# Patient Record
Sex: FEMALE | Race: WHITE
[De-identification: names, ages, dates, MRNs, and addresses within clinical notes are randomized per-mention and may not be internally consistent; named-entity substitution may affect disease eponyms.]

---

## 2017-09-07 ENCOUNTER — HOSPITAL ENCOUNTER (EMERGENCY)
Dept: HOSPITAL 10 - FTE | Age: 67
Discharge: HOME | End: 2017-09-07
Payer: MEDICARE

## 2017-09-07 VITALS
HEIGHT: 64 IN | WEIGHT: 203.93 LBS | HEIGHT: 64 IN | BODY MASS INDEX: 34.82 KG/M2 | BODY MASS INDEX: 34.82 KG/M2 | WEIGHT: 203.93 LBS

## 2017-09-07 DIAGNOSIS — T75.4XXA: Primary | ICD-10-CM

## 2017-09-07 DIAGNOSIS — Z79.82: ICD-10-CM

## 2017-09-07 DIAGNOSIS — I10: ICD-10-CM

## 2017-09-07 DIAGNOSIS — J45.909: ICD-10-CM

## 2017-09-07 PROCEDURE — 93005 ELECTROCARDIOGRAM TRACING: CPT

## 2017-09-07 NOTE — ERD
ER Documentation


Chief Complaint


Date/Time


DATE: 9/7/17 


TIME: 12:50


Chief Complaint


RIGHT ARM PAIN,FEELING DIZZY,POST ELECTRIC SHOCK LAST TUESDAY





HPI


67-year-old female is complaining of right arm pain after electrical shock 2 

days ago.  Patient stated that her washing machine overflowed, and flooded the 

floor.  She went down to  the metal dog bowl with her thumb and 

forefinger, and received a shock.  Patient stated that initially, she was 

unable to move her thumb and fingers.  She still does not have full range of 

motion of her fingers of the right hand.  She still have pain on the pad of her 

thumb.,  She also has pain in her right arm and right shoulder.  Denies falls 

or other impact secondary to the shock.  Denies chest pain or palpitations.





ROS


All systems reviewed and are negative except as per history of present illness.





Medications


Home Meds


Active Scripts


Hydrocodone/Acetaminophen (Norco 5-325 Tablet) 1 Each Tablet, 1 TAB PO Q6H Y 

for SEVERE PAIN LEVEL 7-10, #7 TAB


   Prov:VERONICA COREA. NP         9/7/17


Acetaminophen* (Tylophen*) 500 Mg Capsule, 1 CAP PO Q6H Y for PAIN AND OR 

ELEVATED TEMP, #20 CAP


   Prov:VERONICA COREA. NP         9/7/17


Hydrocodone Bit-Acetaminophen* (Norco*) 5-325 Mg Tab, 1 TAB PO Q6 Y for PAIN, #

10 TAB


   Prov:DAMIAN ALFARO         8/4/15


Reported Medications


Nitroglycerin* (Nitrostat*) 0.4 Mg Tab.subl, 0.4 MG SL Q5MIN Y for CHEST PAIN, 

BOTTLE


   2/27/15


Metoprolol Succinate* (Toprol XL*) 50 Mg Tab.er.24h, 50 MG PO DAILY, TAB


   2/27/15


Aspirin* (Aspirin* (EC)) 81 Mg Tablet.dr, 81 MG PO DAILY, TAB


   2/27/15


Fluticasone Propionate* (Fluticasone Propionate* Nasal) 50 Mcg/Spray - 16 Gm 

[place].susp, 1 SPRAY NASAL BID, EA


   TO EACH NOSTRIL


   2/27/15


Diclofenac Sodium (Pennsaid) 112 Gm Sol.md., 112 GM TP BID


   2/27/15


Albuterol Sulfate* (Proair HFA*) 8.5 Gm Hfa.aer.ad, 2 PUFF INH Q6, INH


   2/27/15


Olopatadine* (Pataday*) 0.2% - 2.5 Ml Drops, 1 DROP BOTH EYES DAILY, EA


   2/27/15


Ergocalciferol (Vitamin D2) 400 Unit Tablet, 1.25 MG PO weekly, TAB


   2/27/15


Hydrochlorothiazide* (Hydrochlorothiazide*) 25 Mg Tab, 25 MG PO DAILY, TAB


   1/4/15


Meloxicam* (Meloxicam*) 7.5 Mg/5 Ml Oral.susp, 15 MG PO DAILY, ML


   1/4/15


Amlodipine Besylate* (Norvasc*) 10 Mg Tablet, 10 MG PO DAILY, TAB


   1/4/15


Gabapentin* (Gabapentin*) 300 Mg Capsule, 300 MG PO TID, CAP


   1/4/15





Allergies


Allergies:  


Coded Allergies:  


     No Known Allergy (Unverified , 2/27/15)





PMhx/Soc


History of Surgery:  Yes (C SECTION, EYE SURGERY)


Anesthesia Reaction:  No


Hx Neurological Disorder:  No


Hx Respiratory Disorders:  Yes (ASTHMA)


Hx Cardiac Disorders:  Yes (HTN)


Hx Psychiatric Problems:  No


Hx Miscellaneous Medical Probl:  Yes (HTN, ARTHRITIS)


Hx Alcohol Use:  No


Hx Substance Use:  No


Hx Tobacco Use:  No





Physical Exam


Vitals





Vital Signs








  Date Time  Temp Pulse Resp B/P Pulse Ox O2 Delivery O2 Flow Rate FiO2


 


9/7/17 09:50 98.3 95 18 169/97 98   








Physical Exam


General:    Patient is well-developed.  Awake, alert, and conversant, in no 

apparent distress


Skin:    Warm and dry


Head:    Normocephalic, atraumatic without palpable deformities


Eyes:    Pupils equal, round, and reactive to light.  Extraocular movements 

intact.  No periorbital ecchymosis or step-off


Neck:    No midline point tenderness, step-off, or deformity to firm palpation 

of posterior cervical spine.  Trachea midline.  Carotids equal.  No masses.  No 

JVD.  Full range of motion of the neck without limitation or pain


Chest:    No surface trauma.  Nontender without crepitus or deformity.  No 

palpable subcutaneous air.  Lungs have good tidal volume, lungs clear to 

auscultate bilaterally


Heart:    Regular rate and rhythm.  No murmur, rub, or gallop


Extremities:    No surface trauma.  Limited range of motion of the thumb and 

forefinger of the right hand.  Slightly limited range of motion of the right 

shoulder.  Good strength in all extremities.  Sensation to light touch intact.  

All peripheral pulses are intact and equal


Neuro:    Alert and oriented 4, GCS 15, cranial nerves II through XII intact.  

Motor and sensory exam is nonfocal.  Reflexes are symmetric


Results 24 hrs





 Current Medications








 Medications


  (Trade)  Dose


 Ordered  Sig/Eloisa


 Route


 PRN Reason  Start Time


 Stop Time Status Last Admin


Dose Admin


 


 Acetaminophen/


 Hydrocodone Bitart


  (Norco (5/325))  1 tab  ONCE  ONCE


 PO


   9/7/17 11:30


 9/7/17 11:31 DC 9/7/17 11:29


 





PROCEDURE:   XR Forearm 2 Views. 


 


CLINICAL INDICATION:  Right forearm pain.


 


TECHNIQUE:   AP and lateral views of the right forearm were obtained. 


 


COMPARISON:   No prior studies are available for comparison. 


 


FINDINGS:


The osseous structures are intact. No destructive bony lesions are identified.  

Interosseous spaces appear normal.  The soft tissues are unremarkable.


 


IMPRESSION:


Unremarkable right forearm. 


 


If further characterization is needed CT or MRI could be helpful.


 


If there is high clinical suspicion for traumatic injury, further evaluation 

with CT should be considered.


 


 


RPTAT: AA


_____________________________________________ 


.Yan Mitchell MD, MD           Date    Time 


Electronically viewed and signed by .Yan Mitchell MD, MD on 09/07/2017 12:36 


 


D:  09/07/2017 12:36  T:  09/07/2017 12:36


.P/





CC: VERONICA COREA NP





PROCEDURE:   XR Humerus. 


 


CLINICAL INDICATION:   Right arm pain and trauma.


 


TECHNIQUE:   AP and lateral views of the right humerus were performed. 


 


COMPARISON:   None. 


 


FINDINGS:


The osseous structures are intact.  No destructive bony lesions are identified. 

Interosseous spaces appear unremarkable.  Small soft tissue calcifications are 

identified posterior to the elbow.


 


IMPRESSION:


No visualized traumatic injury.


 


Small soft tissue calcification posterior to the elbow. These likely reflect 

heterotopic soft tissue calcifications.


 


If there is high clinical suspicion for traumatic injury, further evaluation 

with CT should be considered.


 


 


RPTAT: AA


_____________________________________________ 


.Yan Mitchell MD, MD           Date    Time 


Electronically viewed and signed by .Yan Mitchell MD, MD on 09/07/2017 12:35 


 


D:  09/07/2017 12:35  T:  09/07/2017 12:35


.P/





CC: ANMOLVERONICA X. NP





Procedures/MDM


Well-appearing 67-year-old female presented ED with right upper extremity pain 

after receiving electroshock of the right hand.  No sign of electrical burns on 

the exam.





EKG: Normal sinus rhythm 70 bpm, normal axis.  Poor R-wave progression likely 

due to positioning of the leads.  No ST segment elevation or depression. No 

ectopic beats. No QT prolongation. No other EKG abnormalities.  EKG read by Dr. Mead.  Patient does not have dysrhythmia or any signs of cardiac injury 

due to electroshock.





X-ray of the right forearm and right humerus was obtained.  No sign of 

traumatic injury on x-ray.





Likely patient continued limited range of motion of her right hand is due to 

electrical injury of the nerves.  Patient is advised to follow-up with PCP for 

a neurology referral.





[place] given to the patient in the ED for pain relief.  Patient reports 

improvement pain after Norco.





Patient appears well, stable for discharge and outpatient management. Medical 

decision making shared with patient and family. Education provided to patient 

and family. Patient and family expressed understanding of the plan.





Medications on discharge: Tylenol, Norco.


Follow-up: Primary care provider in 2-3 days or return to ED if worse.





The case was reviewed and discussed with Dr. Mead, who agrees with the 

plan of care including labs, treatment, and advanced imaging as appropriate.





Disclaimer: Inadvertent spelling and grammatical errors are likely due to EHR/

dictation software use and do not reflect on the overall quality of patient 

care. Also, please note that the electronic time recorded on this note does not 

necessarily reflect the actual time of the patient encounter.





Departure


Diagnosis:  


 Primary Impression:  


 Electrical shock of hand


 Encounter type:  initial encounter  Qualified Code:  T75.4XXA - Electrical 

shock of hand, initial encounter


Condition:  Stable


Patient Instructions:  Electrical Injury


Referrals:  


[facility]


Usted se ha hecho un examen mdico de control que le indica que no est en jackie 

condicin que requiera tratamiento urgente en el Departamento de Emergencia. Un 

estudio ms profundo y el tratamiento de hart condicin pueden esperar sin ningn 

riesgo hasta que usted sea atendida/o en el consultorio de hart mdico o jackie cl

karlos. Es responsabilidad suya arreglar jackie davis para el seguimiento del ashley. 





MANEJO DE CONDICIONES NO URGENTES EN EL FUTURO


1) Si usted tiene un mdico de atencin primaria:





Usted debera llamar a ahrt mdico de atencin primaria antes de venir al 

departamento de emergencia. Despus de las horas de consultorio, hart doctor o hart 

asociado/a est disponible por telfono. El mdico o enfermero de tevin en el 

servicio telefnico puede asesorarle por eloise medio para atender el problema, o 

ashley contrario se puede programar jackie davis.





2) Si usted no tiene un mdico de atencin primaria:


Llame al mdico o clnica de referencia que aparece abajo carmencita las horas de 

consultorio para hacer jackie davis para que le vean.





CLINICAS:


[place] [id number] [phone] [facility] [facility]  [phone] [address] [facility]. [facility] [phone]


[address] [facility] [place] [phone]


[address] [facility]. [place] [id number] [phone] [facility]. [phone] 


[address]





Additional Instructions:  


Llame al doctor MAANA y adalgisa jackie DAVIS PARA DENTRO DE 2-3 YARBROUGH.Dgale a la 

secretaria que nosotros le instruimos hacer esta davis.Avise o llame si hart 

condicin se empeora antes de la davis. Regresa aqui si peor o no mejor.











VERONICA COREA. JOSÉ LUIS Sep 7, 2017 13:01

## 2017-09-07 NOTE — RADRPT
PROCEDURE:   XR Humerus. 

 

CLINICAL INDICATION:   Right arm pain and trauma.

 

TECHNIQUE:   AP and lateral views of the right humerus were performed. 

 

COMPARISON:   None. 

 

FINDINGS:

The osseous structures are intact.  No destructive bony lesions are identified. Interosseous spaces 
appear unremarkable.  Small soft tissue calcifications are identified posterior to the elbow.

 

IMPRESSION:

No visualized traumatic injury.

 

Small soft tissue calcification posterior to the elbow. These likely reflect heterotopic soft tissue
 calcifications.

 

If there is high clinical suspicion for traumatic injury, further evaluation with CT should be consi
dered.

 

 

RPTAT: AA

_____________________________________________ 

.Yan Mitchell MD, MD           Date    Time 

Electronically viewed and signed by .Yan Mitchell MD, MD on 09/07/2017 12:35 

 

D:  09/07/2017 12:35  T:  09/07/2017 12:35

.P/

## 2017-09-07 NOTE — RADRPT
PROCEDURE:   XR Forearm 2 Views. 

 

CLINICAL INDICATION:  Right forearm pain.

 

TECHNIQUE:   AP and lateral views of the right forearm were obtained. 

 

COMPARISON:   No prior studies are available for comparison. 

 

FINDINGS:

The osseous structures are intact. No destructive bony lesions are identified.  Interosseous spaces 
appear normal.  The soft tissues are unremarkable.

 

IMPRESSION:

Unremarkable right forearm. 

 

If further characterization is needed CT or MRI could be helpful.

 

If there is high clinical suspicion for traumatic injury, further evaluation with CT should be consi
dered.

 

 

RPTAT: AA

_____________________________________________ 

.Yan Mitchell MD, MD           Date    Time 

Electronically viewed and signed by .Yan Mitchell MD, MD on 09/07/2017 12:36 

 

D:  09/07/2017 12:36  T:  09/07/2017 12:36

.P/

## 2018-01-12 ENCOUNTER — HOSPITAL ENCOUNTER (INPATIENT)
Dept: HOSPITAL 91 - E/R | Age: 68
LOS: 3 days | Discharge: HOME | DRG: 694 | End: 2018-01-15
Payer: MEDICARE

## 2018-01-12 ENCOUNTER — HOSPITAL ENCOUNTER (INPATIENT)
Age: 68
LOS: 3 days | Discharge: HOME | DRG: 694 | End: 2018-01-15

## 2018-01-12 DIAGNOSIS — I10: ICD-10-CM

## 2018-01-12 DIAGNOSIS — N13.2: Primary | ICD-10-CM

## 2018-01-12 DIAGNOSIS — E78.5: ICD-10-CM

## 2018-01-12 DIAGNOSIS — Z87.442: ICD-10-CM

## 2018-01-12 DIAGNOSIS — N39.0: ICD-10-CM

## 2018-01-12 LAB
ADD MAN DIFF?: NO
ADD MAN DIFF?: NO
ADD UMIC: YES
ALANINE AMINOTRANSFERASE: 103 IU/L (ref 13–69)
ALANINE AMINOTRANSFERASE: 37 IU/L (ref 13–69)
ALBUMIN/GLOBULIN RATIO: 1.12
ALBUMIN/GLOBULIN RATIO: 1.15
ALBUMIN: 3.6 G/DL (ref 3.3–4.9)
ALBUMIN: 4.4 G/DL (ref 3.3–4.9)
ALKALINE PHOSPHATASE: 125 IU/L (ref 42–121)
ALKALINE PHOSPHATASE: 84 IU/L (ref 42–121)
ANION GAP: 11 (ref 8–16)
ANION GAP: 19 (ref 8–16)
ASPARTATE AMINO TRANSFERASE: 110 IU/L (ref 15–46)
ASPARTATE AMINO TRANSFERASE: 33 IU/L (ref 15–46)
BASOPHIL #: 0 10^3/UL (ref 0–0.1)
BASOPHIL #: 0.1 10^3/UL (ref 0–0.1)
BASOPHILS %: 0.3 % (ref 0–2)
BASOPHILS %: 0.4 % (ref 0–2)
BILIRUBIN,DIRECT: 0 MG/DL (ref 0–0.2)
BILIRUBIN,DIRECT: 0 MG/DL (ref 0–0.2)
BILIRUBIN,TOTAL: 0.2 MG/DL (ref 0.2–1.3)
BILIRUBIN,TOTAL: 0.4 MG/DL (ref 0.2–1.3)
BLOOD UREA NITROGEN: 15 MG/DL (ref 7–20)
BLOOD UREA NITROGEN: 17 MG/DL (ref 7–20)
CALCIUM: 10.2 MG/DL (ref 8.4–10.2)
CALCIUM: 9.3 MG/DL (ref 8.4–10.2)
CARBON DIOXIDE: 20 MMOL/L (ref 21–31)
CARBON DIOXIDE: 28 MMOL/L (ref 21–31)
CHLORIDE: 107 MMOL/L (ref 97–110)
CHLORIDE: 108 MMOL/L (ref 97–110)
CHOL/HDL RATIO: 4.6 RATIO
CHOLESTEROL: 122 MG/DL (ref 100–200)
CREATININE: 0.67 MG/DL (ref 0.44–1)
CREATININE: 0.76 MG/DL (ref 0.44–1)
EOSINOPHILS #: 0.1 10^3/UL (ref 0–0.5)
EOSINOPHILS #: 0.1 10^3/UL (ref 0–0.5)
EOSINOPHILS %: 0.8 % (ref 0–7)
EOSINOPHILS %: 0.9 % (ref 0–7)
GLOBULIN: 3.2 G/DL (ref 1.3–3.2)
GLOBULIN: 3.8 G/DL (ref 1.3–3.2)
GLUCOSE: 110 MG/DL (ref 70–220)
GLUCOSE: 97 MG/DL (ref 70–220)
HDL CHOLESTEROL: 26 MG/DL (ref 35–98)
HEMATOCRIT: 36.9 % (ref 37–47)
HEMATOCRIT: 43.4 % (ref 37–47)
HEMOGLOBIN A1C: 5.6 % (ref 0–5.9)
HEMOGLOBIN: 12.7 G/DL (ref 12–16)
HEMOGLOBIN: 14.6 G/DL (ref 12–16)
LDL CHOLESTEROL,CALCULATED: 73 MG/DL
LIPASE: 116 U/L (ref 23–300)
LYMPHOCYTES #: 1.4 10^3/UL (ref 0.8–2.9)
LYMPHOCYTES #: 3.3 10^3/UL (ref 0.8–2.9)
LYMPHOCYTES %: 18.8 % (ref 15–51)
LYMPHOCYTES %: 23.3 % (ref 15–51)
MAGNESIUM: 1.8 MG/DL (ref 1.7–2.5)
MEAN CORPUSCULAR HEMOGLOBIN: 28.9 PG (ref 29–33)
MEAN CORPUSCULAR HEMOGLOBIN: 29.6 PG (ref 29–33)
MEAN CORPUSCULAR HGB CONC: 33.6 G/DL (ref 32–37)
MEAN CORPUSCULAR HGB CONC: 34.4 G/DL (ref 32–37)
MEAN CORPUSCULAR VOLUME: 85.9 FL (ref 82–101)
MEAN CORPUSCULAR VOLUME: 86 FL (ref 82–101)
MEAN PLATELET VOLUME: 10.2 FL (ref 7.4–10.4)
MEAN PLATELET VOLUME: 11.3 FL (ref 7.4–10.4)
MONOCYTE #: 0.8 10^3/UL (ref 0.3–0.9)
MONOCYTE #: 1.2 10^3/UL (ref 0.3–0.9)
MONOCYTES %: 10.1 % (ref 0–11)
MONOCYTES %: 8.6 % (ref 0–11)
NEUTROPHIL #: 5.3 10^3/UL (ref 1.6–7.5)
NEUTROPHIL #: 9.3 10^3/UL (ref 1.6–7.5)
NEUTROPHILS %: 66.5 % (ref 39–77)
NEUTROPHILS %: 69.6 % (ref 39–77)
NUCLEATED RED BLOOD CELLS #: 0 10^3/UL (ref 0–0)
NUCLEATED RED BLOOD CELLS #: 0 10^3/UL (ref 0–0)
NUCLEATED RED BLOOD CELLS%: 0 /100WBC (ref 0–0)
NUCLEATED RED BLOOD CELLS%: 0 /100WBC (ref 0–0)
PLATELET COUNT: 258 10^3/UL (ref 140–415)
PLATELET COUNT: 276 10^3/UL (ref 140–415)
POTASSIUM: 3.8 MMOL/L (ref 3.5–5.1)
POTASSIUM: 4.7 MMOL/L (ref 3.5–5.1)
RED BLOOD COUNT: 4.29 10^6/UL (ref 4.2–5.4)
RED BLOOD COUNT: 5.05 10^6/UL (ref 4.2–5.4)
RED CELL DISTRIBUTION WIDTH: 12.8 % (ref 11.5–14.5)
RED CELL DISTRIBUTION WIDTH: 13 % (ref 11.5–14.5)
SODIUM: 142 MMOL/L (ref 135–144)
SODIUM: 142 MMOL/L (ref 135–144)
THYROID STIMULATING HORMONE: 1.11 MIU/L (ref 0.47–4.68)
TOTAL PROTEIN: 6.8 G/DL (ref 6.1–8.1)
TOTAL PROTEIN: 8.2 G/DL (ref 6.1–8.1)
TRIGLYCERIDES: 116 MG/DL (ref 0–149)
UR ASCORBIC ACID: NEGATIVE MG/DL
UR BACTERIA: (no result) /HPF
UR BILIRUBIN (DIP): NEGATIVE MG/DL
UR BLOOD (DIP): (no result) MG/DL
UR CLARITY: CLEAR
UR COLOR: (no result)
UR GLUCOSE (DIP): NEGATIVE MG/DL
UR KETONES (DIP): NEGATIVE MG/DL
UR LEUKOCYTE ESTERASE (DIP): (no result) LEU/UL
UR MUCUS: (no result) /HPF
UR NITRITE (DIP): NEGATIVE MG/DL
UR PH (DIP): 6 (ref 5–9)
UR RBC: 2 /HPF (ref 0–5)
UR SPECIFIC GRAVITY (DIP): 1.01 (ref 1–1.03)
UR SQUAMOUS EPITHELIAL CELL: (no result) /HPF
UR TOTAL PROTEIN (DIP): NEGATIVE MG/DL
UR UROBILINOGEN (DIP): NEGATIVE MG/DL
UR WBC: 4 /HPF (ref 0–5)
WHITE BLOOD COUNT: 14 10^3/UL (ref 4.8–10.8)
WHITE BLOOD COUNT: 7.7 10^3/UL (ref 4.8–10.8)

## 2018-01-12 PROCEDURE — 99285 EMERGENCY DEPT VISIT HI MDM: CPT

## 2018-01-12 PROCEDURE — 96374 THER/PROPH/DIAG INJ IV PUSH: CPT

## 2018-01-12 PROCEDURE — 85025 COMPLETE CBC W/AUTO DIFF WBC: CPT

## 2018-01-12 PROCEDURE — 84443 ASSAY THYROID STIM HORMONE: CPT

## 2018-01-12 PROCEDURE — 74018 RADEX ABDOMEN 1 VIEW: CPT

## 2018-01-12 PROCEDURE — 96375 TX/PRO/DX INJ NEW DRUG ADDON: CPT

## 2018-01-12 PROCEDURE — 83735 ASSAY OF MAGNESIUM: CPT

## 2018-01-12 PROCEDURE — 87340 HEPATITIS B SURFACE AG IA: CPT

## 2018-01-12 PROCEDURE — 83690 ASSAY OF LIPASE: CPT

## 2018-01-12 PROCEDURE — 86709 HEPATITIS A IGM ANTIBODY: CPT

## 2018-01-12 PROCEDURE — 81001 URINALYSIS AUTO W/SCOPE: CPT

## 2018-01-12 PROCEDURE — 74176 CT ABD & PELVIS W/O CONTRAST: CPT

## 2018-01-12 PROCEDURE — 84100 ASSAY OF PHOSPHORUS: CPT

## 2018-01-12 PROCEDURE — 80053 COMPREHEN METABOLIC PANEL: CPT

## 2018-01-12 PROCEDURE — 36415 COLL VENOUS BLD VENIPUNCTURE: CPT

## 2018-01-12 PROCEDURE — 86803 HEPATITIS C AB TEST: CPT

## 2018-01-12 PROCEDURE — 87086 URINE CULTURE/COLONY COUNT: CPT

## 2018-01-12 PROCEDURE — 86704 HEP B CORE ANTIBODY TOTAL: CPT

## 2018-01-12 PROCEDURE — 80061 LIPID PANEL: CPT

## 2018-01-12 PROCEDURE — 83036 HEMOGLOBIN GLYCOSYLATED A1C: CPT

## 2018-01-12 RX ADMIN — ALBUTEROL SULFATE 1 PUFF: 90 AEROSOL, METERED RESPIRATORY (INHALATION) at 13:21

## 2018-01-12 RX ADMIN — ALBUTEROL SULFATE 1 PUFF: 90 AEROSOL, METERED RESPIRATORY (INHALATION) at 14:00

## 2018-01-12 RX ADMIN — ONDANSETRON HYDROCHLORIDE 1 MG: 2 INJECTION, SOLUTION INTRAMUSCULAR; INTRAVENOUS at 01:33

## 2018-01-12 RX ADMIN — TAMSULOSIN HYDROCHLORIDE 1 MG: 0.4 CAPSULE ORAL at 10:52

## 2018-01-12 RX ADMIN — FLUTICASONE PROPIONATE 1 SPRAY: 50 SPRAY, METERED NASAL at 21:34

## 2018-01-12 RX ADMIN — THIAMINE HYDROCHLORIDE 1 MLS/HR: 100 INJECTION, SOLUTION INTRAMUSCULAR; INTRAVENOUS at 17:39

## 2018-01-12 RX ADMIN — AMLODIPINE BESYLATE 1 MG: 10 TABLET ORAL at 10:53

## 2018-01-12 RX ADMIN — THIAMINE HYDROCHLORIDE 1 MLS/HR: 100 INJECTION, SOLUTION INTRAMUSCULAR; INTRAVENOUS at 14:06

## 2018-01-12 RX ADMIN — OLOPATADINE HYDROCHLORIDE 1 DROP: 1.11 SOLUTION/ DROPS OPHTHALMIC at 21:58

## 2018-01-12 RX ADMIN — LEVOFLOXACIN 1 MLS/HR: 5 INJECTION, SOLUTION INTRAVENOUS at 05:01

## 2018-01-12 RX ADMIN — CEFTRIAXONE 1 MLS/HR: 1 INJECTION, SOLUTION INTRAVENOUS at 03:31

## 2018-01-12 RX ADMIN — OLOPATADINE HYDROCHLORIDE 1 DROP: 1.11 SOLUTION/ DROPS OPHTHALMIC at 11:07

## 2018-01-12 RX ADMIN — THIAMINE HYDROCHLORIDE 1 MLS/HR: 100 INJECTION, SOLUTION INTRAMUSCULAR; INTRAVENOUS at 05:01

## 2018-01-12 RX ADMIN — ALBUTEROL SULFATE 1 PUFF: 90 AEROSOL, METERED RESPIRATORY (INHALATION) at 20:00

## 2018-01-13 LAB
ADD MAN DIFF?: NO
ALANINE AMINOTRANSFERASE: 81 IU/L (ref 13–69)
ALBUMIN/GLOBULIN RATIO: 1.27
ALBUMIN: 3.7 G/DL (ref 3.3–4.9)
ALKALINE PHOSPHATASE: 111 IU/L (ref 42–121)
ANION GAP: 14 (ref 8–16)
ASPARTATE AMINO TRANSFERASE: 48 IU/L (ref 15–46)
BASOPHIL #: 0 10^3/UL (ref 0–0.1)
BASOPHILS %: 0.6 % (ref 0–2)
BILIRUBIN,DIRECT: 0 MG/DL (ref 0–0.2)
BILIRUBIN,TOTAL: 0.5 MG/DL (ref 0.2–1.3)
BLOOD UREA NITROGEN: 13 MG/DL (ref 7–20)
CALCIUM: 9 MG/DL (ref 8.4–10.2)
CARBON DIOXIDE: 26 MMOL/L (ref 21–31)
CHLORIDE: 109 MMOL/L (ref 97–110)
CREATININE: 0.63 MG/DL (ref 0.44–1)
EOSINOPHILS #: 0.1 10^3/UL (ref 0–0.5)
EOSINOPHILS %: 2.3 % (ref 0–7)
GLOBULIN: 2.9 G/DL (ref 1.3–3.2)
GLUCOSE: 91 MG/DL (ref 70–220)
HAAIG REFLEX: (no result)
HEMATOCRIT: 37.1 % (ref 37–47)
HEMOGLOBIN: 12.6 G/DL (ref 12–16)
HEPATITIS B CORE ANTIBODY: NEGATIVE
HEPATITIS B SURFACE ANTIGEN: NEGATIVE
HEPATITIS C VIRAL ANTIBODY: NEGATIVE
LYMPHOCYTES #: 2.3 10^3/UL (ref 0.8–2.9)
LYMPHOCYTES %: 36.5 % (ref 15–51)
MAGNESIUM: 1.9 MG/DL (ref 1.7–2.5)
MEAN CORPUSCULAR HEMOGLOBIN: 29.5 PG (ref 29–33)
MEAN CORPUSCULAR HGB CONC: 34 G/DL (ref 32–37)
MEAN CORPUSCULAR VOLUME: 86.9 FL (ref 82–101)
MEAN PLATELET VOLUME: 10.8 FL (ref 7.4–10.4)
MONOCYTE #: 0.6 10^3/UL (ref 0.3–0.9)
MONOCYTES %: 9.7 % (ref 0–11)
NEUTROPHIL #: 3.1 10^3/UL (ref 1.6–7.5)
NEUTROPHILS %: 50.7 % (ref 39–77)
NUCLEATED RED BLOOD CELLS #: 0 10^3/UL (ref 0–0)
NUCLEATED RED BLOOD CELLS%: 0 /100WBC (ref 0–0)
PHOSPHORUS: 3.5 MG/DL (ref 2.5–4.9)
PLATELET COUNT: 285 10^3/UL (ref 140–415)
POTASSIUM: 3.7 MMOL/L (ref 3.5–5.1)
RED BLOOD COUNT: 4.27 10^6/UL (ref 4.2–5.4)
RED CELL DISTRIBUTION WIDTH: 13.2 % (ref 11.5–14.5)
SODIUM: 145 MMOL/L (ref 135–144)
TOTAL PROTEIN: 6.6 G/DL (ref 6.1–8.1)
WHITE BLOOD COUNT: 6.2 10^3/UL (ref 4.8–10.8)

## 2018-01-13 RX ADMIN — THIAMINE HYDROCHLORIDE 1 MLS/HR: 100 INJECTION, SOLUTION INTRAMUSCULAR; INTRAVENOUS at 21:01

## 2018-01-13 RX ADMIN — FLUTICASONE PROPIONATE 1 SPRAY: 50 SPRAY, METERED NASAL at 21:00

## 2018-01-13 RX ADMIN — ALBUTEROL SULFATE 1 PUFF: 90 AEROSOL, METERED RESPIRATORY (INHALATION) at 13:58

## 2018-01-13 RX ADMIN — LEVOFLOXACIN 1 MLS/HR: 5 INJECTION, SOLUTION INTRAVENOUS at 05:00

## 2018-01-13 RX ADMIN — THIAMINE HYDROCHLORIDE 1 MLS/HR: 100 INJECTION, SOLUTION INTRAMUSCULAR; INTRAVENOUS at 06:18

## 2018-01-13 RX ADMIN — OLOPATADINE HYDROCHLORIDE 1 DROP: 1.11 SOLUTION/ DROPS OPHTHALMIC at 21:00

## 2018-01-13 RX ADMIN — OLOPATADINE HYDROCHLORIDE 1 DROP: 1.11 SOLUTION/ DROPS OPHTHALMIC at 08:55

## 2018-01-13 RX ADMIN — TAMSULOSIN HYDROCHLORIDE 1 MG: 0.4 CAPSULE ORAL at 21:02

## 2018-01-13 RX ADMIN — ALBUTEROL SULFATE 1 PUFF: 90 AEROSOL, METERED RESPIRATORY (INHALATION) at 21:00

## 2018-01-13 RX ADMIN — ALBUTEROL SULFATE 1 PUFF: 90 AEROSOL, METERED RESPIRATORY (INHALATION) at 08:55

## 2018-01-13 RX ADMIN — TAMSULOSIN HYDROCHLORIDE 1 MG: 0.4 CAPSULE ORAL at 11:46

## 2018-01-13 RX ADMIN — POLYETHYLENE GLYCOL 3350 1 GM: 17 POWDER, FOR SOLUTION ORAL at 21:00

## 2018-01-13 RX ADMIN — ALBUTEROL SULFATE 1 PUFF: 90 AEROSOL, METERED RESPIRATORY (INHALATION) at 02:00

## 2018-01-13 RX ADMIN — LEVOFLOXACIN 1 MLS/HR: 5 INJECTION, SOLUTION INTRAVENOUS at 08:54

## 2018-01-13 RX ADMIN — FLUTICASONE PROPIONATE 1 SPRAY: 50 SPRAY, METERED NASAL at 08:55

## 2018-01-13 RX ADMIN — THIAMINE HYDROCHLORIDE 1 MLS/HR: 100 INJECTION, SOLUTION INTRAMUSCULAR; INTRAVENOUS at 00:09

## 2018-01-13 RX ADMIN — AMLODIPINE BESYLATE 1 MG: 10 TABLET ORAL at 08:56

## 2018-01-14 LAB
ADD MAN DIFF?: NO
ALANINE AMINOTRANSFERASE: 55 IU/L (ref 13–69)
ALBUMIN/GLOBULIN RATIO: 1.06
ALBUMIN: 3.5 G/DL (ref 3.3–4.9)
ALKALINE PHOSPHATASE: 96 IU/L (ref 42–121)
ANION GAP: 14 (ref 8–16)
ASPARTATE AMINO TRANSFERASE: 28 IU/L (ref 15–46)
BASOPHIL #: 0 10^3/UL (ref 0–0.1)
BASOPHILS %: 0.6 % (ref 0–2)
BILIRUBIN,DIRECT: 0 MG/DL (ref 0–0.2)
BILIRUBIN,TOTAL: 0.3 MG/DL (ref 0.2–1.3)
BLOOD UREA NITROGEN: 11 MG/DL (ref 7–20)
CALCIUM: 9.6 MG/DL (ref 8.4–10.2)
CARBON DIOXIDE: 25 MMOL/L (ref 21–31)
CHLORIDE: 110 MMOL/L (ref 97–110)
CREATININE: 0.6 MG/DL (ref 0.44–1)
EOSINOPHILS #: 0.1 10^3/UL (ref 0–0.5)
EOSINOPHILS %: 1.7 % (ref 0–7)
GLOBULIN: 3.3 G/DL (ref 1.3–3.2)
GLUCOSE: 97 MG/DL (ref 70–220)
HAAIG REFLEX: (no result)
HEMATOCRIT: 36.1 % (ref 37–47)
HEMOGLOBIN: 12.4 G/DL (ref 12–16)
HEPATITIS B CORE ANTIBODY: NEGATIVE
HEPATITIS B SURFACE ANTIGEN: NEGATIVE
HEPATITIS C VIRAL ANTIBODY: NEGATIVE
LYMPHOCYTES #: 2.4 10^3/UL (ref 0.8–2.9)
LYMPHOCYTES %: 37.1 % (ref 15–51)
MAGNESIUM: 1.7 MG/DL (ref 1.7–2.5)
MEAN CORPUSCULAR HEMOGLOBIN: 29.9 PG (ref 29–33)
MEAN CORPUSCULAR HGB CONC: 34.3 G/DL (ref 32–37)
MEAN CORPUSCULAR VOLUME: 87 FL (ref 82–101)
MEAN PLATELET VOLUME: 10.7 FL (ref 7.4–10.4)
MONOCYTE #: 0.6 10^3/UL (ref 0.3–0.9)
MONOCYTES %: 8.6 % (ref 0–11)
NEUTROPHIL #: 3.3 10^3/UL (ref 1.6–7.5)
NEUTROPHILS %: 51.8 % (ref 39–77)
NUCLEATED RED BLOOD CELLS #: 0 10^3/UL (ref 0–0)
NUCLEATED RED BLOOD CELLS%: 0 /100WBC (ref 0–0)
PHOSPHORUS: 3.9 MG/DL (ref 2.5–4.9)
PLATELET COUNT: 281 10^3/UL (ref 140–415)
POTASSIUM: 3.5 MMOL/L (ref 3.5–5.1)
RED BLOOD COUNT: 4.15 10^6/UL (ref 4.2–5.4)
RED CELL DISTRIBUTION WIDTH: 13.1 % (ref 11.5–14.5)
SODIUM: 145 MMOL/L (ref 135–144)
TOTAL PROTEIN: 6.8 G/DL (ref 6.1–8.1)
WHITE BLOOD COUNT: 6.4 10^3/UL (ref 4.8–10.8)

## 2018-01-14 RX ADMIN — ALBUTEROL SULFATE 1 PUFF: 90 AEROSOL, METERED RESPIRATORY (INHALATION) at 13:43

## 2018-01-14 RX ADMIN — THIAMINE HYDROCHLORIDE 1 MLS/HR: 100 INJECTION, SOLUTION INTRAMUSCULAR; INTRAVENOUS at 06:09

## 2018-01-14 RX ADMIN — OLOPATADINE HYDROCHLORIDE 1 DROP: 1.11 SOLUTION/ DROPS OPHTHALMIC at 21:00

## 2018-01-14 RX ADMIN — TAMSULOSIN HYDROCHLORIDE 1 MG: 0.4 CAPSULE ORAL at 09:05

## 2018-01-14 RX ADMIN — FLUTICASONE PROPIONATE 1 SPRAY: 50 SPRAY, METERED NASAL at 09:05

## 2018-01-14 RX ADMIN — ALBUTEROL SULFATE 1 PUFF: 90 AEROSOL, METERED RESPIRATORY (INHALATION) at 02:00

## 2018-01-14 RX ADMIN — LEVOFLOXACIN 1 MLS/HR: 5 INJECTION, SOLUTION INTRAVENOUS at 04:13

## 2018-01-14 RX ADMIN — THIAMINE HYDROCHLORIDE 1 MLS/HR: 100 INJECTION, SOLUTION INTRAMUSCULAR; INTRAVENOUS at 20:54

## 2018-01-14 RX ADMIN — FLUTICASONE PROPIONATE 1 SPRAY: 50 SPRAY, METERED NASAL at 09:00

## 2018-01-14 RX ADMIN — LOSARTAN POTASSIUM 1 MG: 50 TABLET ORAL at 09:05

## 2018-01-14 RX ADMIN — POLYETHYLENE GLYCOL 3350 1 GM: 17 POWDER, FOR SOLUTION ORAL at 09:05

## 2018-01-14 RX ADMIN — TAMSULOSIN HYDROCHLORIDE 1 MG: 0.4 CAPSULE ORAL at 20:54

## 2018-01-14 RX ADMIN — THIAMINE HYDROCHLORIDE 1 MLS/HR: 100 INJECTION, SOLUTION INTRAMUSCULAR; INTRAVENOUS at 09:10

## 2018-01-14 RX ADMIN — OLOPATADINE HYDROCHLORIDE 1 DROP: 1.11 SOLUTION/ DROPS OPHTHALMIC at 09:00

## 2018-01-14 RX ADMIN — FLUTICASONE PROPIONATE 1 SPRAY: 50 SPRAY, METERED NASAL at 20:54

## 2018-01-14 RX ADMIN — OLOPATADINE HYDROCHLORIDE 1 DROP: 1.11 SOLUTION/ DROPS OPHTHALMIC at 20:54

## 2018-01-14 RX ADMIN — THIAMINE HYDROCHLORIDE 1 MLS/HR: 100 INJECTION, SOLUTION INTRAMUSCULAR; INTRAVENOUS at 15:13

## 2018-01-14 RX ADMIN — OLOPATADINE HYDROCHLORIDE 1 DROP: 1.11 SOLUTION/ DROPS OPHTHALMIC at 09:05

## 2018-01-14 RX ADMIN — POLYETHYLENE GLYCOL 3350 1 GM: 17 POWDER, FOR SOLUTION ORAL at 20:54

## 2018-01-14 RX ADMIN — FLUTICASONE PROPIONATE 1 SPRAY: 50 SPRAY, METERED NASAL at 21:00

## 2018-01-14 RX ADMIN — ALBUTEROL SULFATE 1 PUFF: 90 AEROSOL, METERED RESPIRATORY (INHALATION) at 08:00

## 2018-01-15 LAB
ADD MAN DIFF?: NO
ALANINE AMINOTRANSFERASE: 47 IU/L (ref 13–69)
ALBUMIN/GLOBULIN RATIO: 1.33
ALBUMIN: 4 G/DL (ref 3.3–4.9)
ALKALINE PHOSPHATASE: 98 IU/L (ref 42–121)
ANION GAP: 16 (ref 8–16)
ASPARTATE AMINO TRANSFERASE: 30 IU/L (ref 15–46)
BASOPHIL #: 0 10^3/UL (ref 0–0.1)
BASOPHILS %: 0.3 % (ref 0–2)
BILIRUBIN,DIRECT: 0 MG/DL (ref 0–0.2)
BILIRUBIN,TOTAL: 0.1 MG/DL (ref 0.2–1.3)
BLOOD UREA NITROGEN: 11 MG/DL (ref 7–20)
CALCIUM: 9.5 MG/DL (ref 8.4–10.2)
CARBON DIOXIDE: 23 MMOL/L (ref 21–31)
CHLORIDE: 110 MMOL/L (ref 97–110)
CREATININE: 0.56 MG/DL (ref 0.44–1)
EOSINOPHILS #: 0.1 10^3/UL (ref 0–0.5)
EOSINOPHILS %: 1.5 % (ref 0–7)
GLOBULIN: 3 G/DL (ref 1.3–3.2)
GLUCOSE: 91 MG/DL (ref 70–220)
HEMATOCRIT: 37.5 % (ref 37–47)
HEMOGLOBIN: 12.9 G/DL (ref 12–16)
LYMPHOCYTES #: 2.8 10^3/UL (ref 0.8–2.9)
LYMPHOCYTES %: 31.8 % (ref 15–51)
MAGNESIUM: 1.7 MG/DL (ref 1.7–2.5)
MEAN CORPUSCULAR HEMOGLOBIN: 29.8 PG (ref 29–33)
MEAN CORPUSCULAR HGB CONC: 34.4 G/DL (ref 32–37)
MEAN CORPUSCULAR VOLUME: 86.6 FL (ref 82–101)
MEAN PLATELET VOLUME: 11.1 FL (ref 7.4–10.4)
MONOCYTE #: 0.6 10^3/UL (ref 0.3–0.9)
MONOCYTES %: 7.1 % (ref 0–11)
NEUTROPHIL #: 5.2 10^3/UL (ref 1.6–7.5)
NEUTROPHILS %: 59.1 % (ref 39–77)
NUCLEATED RED BLOOD CELLS #: 0 10^3/UL (ref 0–0)
NUCLEATED RED BLOOD CELLS%: 0 /100WBC (ref 0–0)
PHOSPHORUS: 4.2 MG/DL (ref 2.5–4.9)
PLATELET COUNT: 303 10^3/UL (ref 140–415)
POTASSIUM: 4 MMOL/L (ref 3.5–5.1)
RED BLOOD COUNT: 4.33 10^6/UL (ref 4.2–5.4)
RED CELL DISTRIBUTION WIDTH: 13 % (ref 11.5–14.5)
SODIUM: 145 MMOL/L (ref 135–144)
TOTAL PROTEIN: 7 G/DL (ref 6.1–8.1)
WHITE BLOOD COUNT: 8.9 10^3/UL (ref 4.8–10.8)

## 2018-01-15 RX ADMIN — POLYETHYLENE GLYCOL 3350 1 GM: 17 POWDER, FOR SOLUTION ORAL at 08:25

## 2018-01-15 RX ADMIN — LEVOFLOXACIN 1 MLS/HR: 5 INJECTION, SOLUTION INTRAVENOUS at 05:13

## 2018-01-15 RX ADMIN — FLUTICASONE PROPIONATE 1 SPRAY: 50 SPRAY, METERED NASAL at 08:24

## 2018-01-15 RX ADMIN — TAMSULOSIN HYDROCHLORIDE 1 MG: 0.4 CAPSULE ORAL at 08:24

## 2018-01-15 RX ADMIN — OLOPATADINE HYDROCHLORIDE 1 DROP: 1.11 SOLUTION/ DROPS OPHTHALMIC at 08:26

## 2018-01-15 RX ADMIN — LOSARTAN POTASSIUM 1 MG: 50 TABLET ORAL at 08:24

## 2018-01-15 RX ADMIN — THIAMINE HYDROCHLORIDE 1 MLS/HR: 100 INJECTION, SOLUTION INTRAMUSCULAR; INTRAVENOUS at 06:38

## 2018-01-28 ENCOUNTER — HOSPITAL ENCOUNTER (EMERGENCY)
Age: 68
Discharge: HOME | End: 2018-01-28

## 2018-01-28 ENCOUNTER — HOSPITAL ENCOUNTER (EMERGENCY)
Dept: HOSPITAL 91 - FTE | Age: 68
Discharge: HOME | End: 2018-01-28
Payer: MEDICARE

## 2018-01-28 DIAGNOSIS — Z79.82: ICD-10-CM

## 2018-01-28 DIAGNOSIS — J45.909: ICD-10-CM

## 2018-01-28 DIAGNOSIS — R51: Primary | ICD-10-CM

## 2018-01-28 DIAGNOSIS — E66.9: ICD-10-CM

## 2018-01-28 DIAGNOSIS — I10: ICD-10-CM

## 2018-01-28 PROCEDURE — 70450 CT HEAD/BRAIN W/O DYE: CPT

## 2018-01-28 PROCEDURE — 99284 EMERGENCY DEPT VISIT MOD MDM: CPT

## 2018-01-28 RX ADMIN — HYDROCODONE BITARTRATE AND ACETAMINOPHEN 1 TAB: 5; 325 TABLET ORAL at 06:43

## 2018-06-01 ENCOUNTER — HOSPITAL ENCOUNTER (EMERGENCY)
Dept: HOSPITAL 91 - E/R | Age: 68
Discharge: HOME | End: 2018-06-01
Payer: MEDICARE

## 2018-06-01 ENCOUNTER — HOSPITAL ENCOUNTER (EMERGENCY)
Age: 68
Discharge: HOME | End: 2018-06-01

## 2018-06-01 DIAGNOSIS — N20.0: Primary | ICD-10-CM

## 2018-06-01 DIAGNOSIS — N30.00: ICD-10-CM

## 2018-06-01 DIAGNOSIS — J45.909: ICD-10-CM

## 2018-06-01 DIAGNOSIS — I10: ICD-10-CM

## 2018-06-01 DIAGNOSIS — Z79.82: ICD-10-CM

## 2018-06-01 LAB
ADD MAN DIFF?: NO
ADD UMIC: YES
ALANINE AMINOTRANSFERASE: 33 IU/L (ref 13–69)
ALBUMIN/GLOBULIN RATIO: 1.18
ALBUMIN: 3.9 G/DL (ref 3.3–4.9)
ALKALINE PHOSPHATASE: 88 IU/L (ref 42–121)
AMYLASE: 58 U/L (ref 11–123)
ANION GAP: 13 (ref 8–16)
ASPARTATE AMINO TRANSFERASE: 24 IU/L (ref 15–46)
BASOPHIL #: 0.1 10^3/UL (ref 0–0.1)
BASOPHILS %: 0.7 % (ref 0–2)
BILIRUBIN,DIRECT: 0 MG/DL (ref 0–0.2)
BILIRUBIN,TOTAL: 0.2 MG/DL (ref 0.2–1.3)
BLOOD UREA NITROGEN: 20 MG/DL (ref 7–20)
CALCIUM: 9.5 MG/DL (ref 8.4–10.2)
CARBON DIOXIDE: 29 MMOL/L (ref 21–31)
CHLORIDE: 111 MMOL/L (ref 97–110)
CREATININE: 0.74 MG/DL (ref 0.44–1)
EOSINOPHILS #: 0.2 10^3/UL (ref 0–0.5)
EOSINOPHILS %: 2.3 % (ref 0–7)
GLOBULIN: 3.3 G/DL (ref 1.3–3.2)
GLUCOSE: 92 MG/DL (ref 70–220)
HEMATOCRIT: 38.8 % (ref 37–47)
HEMOGLOBIN: 12.9 G/DL (ref 12–16)
INR: 0.97
LIPASE: 77 U/L (ref 23–300)
LYMPHOCYTES #: 2.4 10^3/UL (ref 0.8–2.9)
LYMPHOCYTES %: 27.5 % (ref 15–51)
MEAN CORPUSCULAR HEMOGLOBIN: 29.3 PG (ref 29–33)
MEAN CORPUSCULAR HGB CONC: 33.2 G/DL (ref 32–37)
MEAN CORPUSCULAR VOLUME: 88.2 FL (ref 82–101)
MEAN PLATELET VOLUME: 10.8 FL (ref 7.4–10.4)
MONOCYTE #: 0.7 10^3/UL (ref 0.3–0.9)
MONOCYTES %: 8.3 % (ref 0–11)
NEUTROPHIL #: 5.4 10^3/UL (ref 1.6–7.5)
NEUTROPHILS %: 61 % (ref 39–77)
NUCLEATED RED BLOOD CELLS #: 0 10^3/UL (ref 0–0)
NUCLEATED RED BLOOD CELLS%: 0 /100WBC (ref 0–0)
PARTIAL THROMBOPLASTIN TIME: 30.2 SEC (ref 25–35)
PLATELET COUNT: 293 10^3/UL (ref 140–415)
POTASSIUM: 3.8 MMOL/L (ref 3.5–5.1)
PROTIME: 13 SEC (ref 11.9–14.9)
PT RATIO: 1
RED BLOOD COUNT: 4.4 10^6/UL (ref 4.2–5.4)
RED CELL DISTRIBUTION WIDTH: 13.2 % (ref 11.5–14.5)
SODIUM: 149 MMOL/L (ref 135–144)
TOTAL PROTEIN: 7.2 G/DL (ref 6.1–8.1)
TROPONIN-I: < 0.012 NG/ML (ref 0–0.12)
UR ASCORBIC ACID: NEGATIVE MG/DL
UR BACTERIA: (no result) /HPF
UR BILIRUBIN (DIP): NEGATIVE MG/DL
UR BLOOD (DIP): NEGATIVE MG/DL
UR CLARITY: CLEAR
UR COLOR: YELLOW
UR GLUCOSE (DIP): NEGATIVE MG/DL
UR KETONES (DIP): NEGATIVE MG/DL
UR LEUKOCYTE ESTERASE (DIP): (no result) LEU/UL
UR NITRITE (DIP): NEGATIVE MG/DL
UR PH (DIP): 6 (ref 5–9)
UR RBC: 1 /HPF (ref 0–5)
UR SPECIFIC GRAVITY (DIP): 1.02 (ref 1–1.03)
UR SQUAMOUS EPITHELIAL CELL: (no result) /HPF
UR TOTAL PROTEIN (DIP): NEGATIVE MG/DL
UR UROBILINOGEN (DIP): NEGATIVE MG/DL
UR WBC: 4 /HPF (ref 0–5)
WHITE BLOOD COUNT: 8.9 10^3/UL (ref 4.8–10.8)

## 2018-06-01 PROCEDURE — 99285 EMERGENCY DEPT VISIT HI MDM: CPT

## 2018-06-01 PROCEDURE — 85610 PROTHROMBIN TIME: CPT

## 2018-06-01 PROCEDURE — 74176 CT ABD & PELVIS W/O CONTRAST: CPT

## 2018-06-01 PROCEDURE — 80053 COMPREHEN METABOLIC PANEL: CPT

## 2018-06-01 PROCEDURE — 36415 COLL VENOUS BLD VENIPUNCTURE: CPT

## 2018-06-01 PROCEDURE — 85025 COMPLETE CBC W/AUTO DIFF WBC: CPT

## 2018-06-01 PROCEDURE — 96376 TX/PRO/DX INJ SAME DRUG ADON: CPT

## 2018-06-01 PROCEDURE — 84484 ASSAY OF TROPONIN QUANT: CPT

## 2018-06-01 PROCEDURE — 96374 THER/PROPH/DIAG INJ IV PUSH: CPT

## 2018-06-01 PROCEDURE — 96375 TX/PRO/DX INJ NEW DRUG ADDON: CPT

## 2018-06-01 PROCEDURE — 83690 ASSAY OF LIPASE: CPT

## 2018-06-01 PROCEDURE — 82150 ASSAY OF AMYLASE: CPT

## 2018-06-01 PROCEDURE — 81001 URINALYSIS AUTO W/SCOPE: CPT

## 2018-06-01 PROCEDURE — 85730 THROMBOPLASTIN TIME PARTIAL: CPT

## 2018-06-01 PROCEDURE — 87086 URINE CULTURE/COLONY COUNT: CPT

## 2018-06-01 RX ADMIN — ONDANSETRON HYDROCHLORIDE 1 MG: 2 INJECTION, SOLUTION INTRAMUSCULAR; INTRAVENOUS at 14:37

## 2018-06-01 RX ADMIN — ONDANSETRON HYDROCHLORIDE 1 MG: 2 INJECTION, SOLUTION INTRAMUSCULAR; INTRAVENOUS at 16:47

## 2018-06-01 RX ADMIN — THIAMINE HYDROCHLORIDE 1 MLS/HR: 100 INJECTION, SOLUTION INTRAMUSCULAR; INTRAVENOUS at 14:36

## 2018-06-01 RX ADMIN — KETOROLAC TROMETHAMINE 1 MG: 30 INJECTION, SOLUTION INTRAMUSCULAR at 16:47

## 2018-06-01 RX ADMIN — HYDROMORPHONE HYDROCHLORIDE 1 MG: 2 INJECTION INTRAMUSCULAR; INTRAVENOUS; SUBCUTANEOUS at 16:48

## 2018-09-20 ENCOUNTER — HOSPITAL ENCOUNTER (EMERGENCY)
Age: 68
Discharge: HOME | End: 2018-09-20

## 2018-09-20 ENCOUNTER — HOSPITAL ENCOUNTER (EMERGENCY)
Dept: HOSPITAL 91 - FTE | Age: 68
Discharge: HOME | End: 2018-09-20
Payer: MEDICARE

## 2018-09-20 DIAGNOSIS — Z79.82: ICD-10-CM

## 2018-09-20 DIAGNOSIS — H10.13: Primary | ICD-10-CM

## 2018-09-20 DIAGNOSIS — J45.909: ICD-10-CM

## 2018-09-20 DIAGNOSIS — I10: ICD-10-CM

## 2018-09-20 DIAGNOSIS — H66.92: ICD-10-CM

## 2018-09-20 PROCEDURE — 99283 EMERGENCY DEPT VISIT LOW MDM: CPT

## 2018-10-11 ENCOUNTER — HOSPITAL ENCOUNTER (EMERGENCY)
Dept: HOSPITAL 91 - E/R | Age: 68
Discharge: HOME | End: 2018-10-11
Payer: MEDICARE

## 2018-10-11 ENCOUNTER — HOSPITAL ENCOUNTER (EMERGENCY)
Age: 68
Discharge: HOME | End: 2018-10-11

## 2018-10-11 DIAGNOSIS — S93.601A: ICD-10-CM
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
## 2018-10-11 DIAGNOSIS — S73.101A: Primary | ICD-10-CM

## 2018-10-11 DIAGNOSIS — R56.9: ICD-10-CM

## 2018-10-11 DIAGNOSIS — I10: ICD-10-CM

## 2018-10-11 DIAGNOSIS — Z79.82: ICD-10-CM

## 2018-10-11 DIAGNOSIS — M17.11: ICD-10-CM

## 2018-10-11 DIAGNOSIS — J45.909: ICD-10-CM

## 2018-10-11 DIAGNOSIS — R07.9: ICD-10-CM

## 2018-10-11 LAB
ADD MAN DIFF?: NO
ALANINE AMINOTRANSFERASE: 38 IU/L (ref 13–69)
ALBUMIN/GLOBULIN RATIO: 1.32
ALBUMIN: 4.1 G/DL (ref 3.3–4.9)
ALKALINE PHOSPHATASE: 90 IU/L (ref 42–121)
ANION GAP: 11 (ref 8–16)
ASPARTATE AMINO TRANSFERASE: 37 IU/L (ref 15–46)
BASOPHIL #: 0 10^3/UL (ref 0–0.1)
BASOPHILS %: 0.4 % (ref 0–2)
BILIRUBIN,DIRECT: 0 MG/DL (ref 0–0.2)
BILIRUBIN,TOTAL: 0.3 MG/DL (ref 0.2–1.3)
BLOOD UREA NITROGEN: 16 MG/DL (ref 7–20)
CALCIUM: 9.6 MG/DL (ref 8.4–10.2)
CARBON DIOXIDE: 24 MMOL/L (ref 21–31)
CHLORIDE: 113 MMOL/L (ref 97–110)
CREATININE: 0.43 MG/DL (ref 0.44–1)
EOSINOPHILS #: 0.1 10^3/UL (ref 0–0.5)
EOSINOPHILS %: 1.8 % (ref 0–7)
ETHANOL: < 10 MG/DL
GLOBULIN: 3.1 G/DL (ref 1.3–3.2)
GLUCOSE: 131 MG/DL (ref 70–220)
HEMATOCRIT: 38.4 % (ref 37–47)
HEMOGLOBIN: 12.7 G/DL (ref 12–16)
IMMATURE GRANS #M: 0.02 10^3/UL (ref 0–0.03)
IMMATURE GRANS % (M): 0.3 % (ref 0–0.43)
INR: 0.99
LIPASE: 78 U/L (ref 23–300)
LYMPHOCYTES #: 2.1 10^3/UL (ref 0.8–2.9)
LYMPHOCYTES %: 26.5 % (ref 15–51)
MEAN CORPUSCULAR HEMOGLOBIN: 28.6 PG (ref 29–33)
MEAN CORPUSCULAR HGB CONC: 33.1 G/DL (ref 32–37)
MEAN CORPUSCULAR VOLUME: 86.5 FL (ref 82–101)
MEAN PLATELET VOLUME: 10.6 FL (ref 7.4–10.4)
MONOCYTE #: 0.6 10^3/UL (ref 0.3–0.9)
MONOCYTES %: 7.3 % (ref 0–11)
NEUTROPHIL #: 5 10^3/UL (ref 1.6–7.5)
NEUTROPHILS %: 63.7 % (ref 39–77)
NUCLEATED RED BLOOD CELLS #: 0 10^3/UL (ref 0–0)
NUCLEATED RED BLOOD CELLS%: 0 /100WBC (ref 0–0)
PARTIAL THROMBOPLASTIN TIME: 29.2 SEC (ref 23–35)
PLATELET COUNT: 280 10^3/UL (ref 140–415)
POTASSIUM: 3.7 MMOL/L (ref 3.5–5.1)
PROTIME: 13.2 SEC (ref 11.9–14.9)
PT RATIO: 1
RED BLOOD COUNT: 4.44 10^6/UL (ref 4.2–5.4)
RED CELL DISTRIBUTION WIDTH: 13.3 % (ref 11.5–14.5)
SODIUM: 144 MMOL/L (ref 135–144)
TOTAL PROTEIN: 7.2 G/DL (ref 6.1–8.1)
WHITE BLOOD COUNT: 7.9 10^3/UL (ref 4.8–10.8)

## 2018-10-11 PROCEDURE — 85610 PROTHROMBIN TIME: CPT

## 2018-10-11 PROCEDURE — 99285 EMERGENCY DEPT VISIT HI MDM: CPT

## 2018-10-11 PROCEDURE — 83690 ASSAY OF LIPASE: CPT

## 2018-10-11 PROCEDURE — 70450 CT HEAD/BRAIN W/O DYE: CPT

## 2018-10-11 PROCEDURE — 96374 THER/PROPH/DIAG INJ IV PUSH: CPT

## 2018-10-11 PROCEDURE — 73562 X-RAY EXAM OF KNEE 3: CPT

## 2018-10-11 PROCEDURE — 85730 THROMBOPLASTIN TIME PARTIAL: CPT

## 2018-10-11 PROCEDURE — 80307 DRUG TEST PRSMV CHEM ANLYZR: CPT

## 2018-10-11 PROCEDURE — 72170 X-RAY EXAM OF PELVIS: CPT

## 2018-10-11 PROCEDURE — 85025 COMPLETE CBC W/AUTO DIFF WBC: CPT

## 2018-10-11 PROCEDURE — 73630 X-RAY EXAM OF FOOT: CPT

## 2018-10-11 PROCEDURE — 80053 COMPREHEN METABOLIC PANEL: CPT

## 2018-10-11 PROCEDURE — 36415 COLL VENOUS BLD VENIPUNCTURE: CPT

## 2018-10-11 PROCEDURE — 71045 X-RAY EXAM CHEST 1 VIEW: CPT

## 2018-10-11 RX ADMIN — KETOROLAC TROMETHAMINE 1 MG: 15 INJECTION, SOLUTION INTRAMUSCULAR; INTRAVENOUS at 19:03

## 2018-10-11 RX ADMIN — LIDOCAINE HYDROCHLORIDE 1 MLS/HR: 10 INJECTION, SOLUTION EPIDURAL; INFILTRATION; INTRACAUDAL; PERINEURAL at 19:05

## 2019-09-23 ENCOUNTER — HOSPITAL ENCOUNTER (EMERGENCY)
Dept: HOSPITAL 91 - FTE | Age: 69
Discharge: HOME | End: 2019-09-23
Payer: MEDICARE

## 2019-09-23 DIAGNOSIS — M54.2: Primary | ICD-10-CM

## 2019-09-23 DIAGNOSIS — J45.909: ICD-10-CM

## 2019-09-23 DIAGNOSIS — I10: ICD-10-CM

## 2019-09-23 DIAGNOSIS — Z79.82: ICD-10-CM

## 2019-09-23 PROCEDURE — 96372 THER/PROPH/DIAG INJ SC/IM: CPT

## 2019-09-23 PROCEDURE — 99284 EMERGENCY DEPT VISIT MOD MDM: CPT

## 2019-09-23 PROCEDURE — 72040 X-RAY EXAM NECK SPINE 2-3 VW: CPT

## 2019-09-23 RX ADMIN — KETOROLAC TROMETHAMINE 1 MG: 30 INJECTION, SOLUTION INTRAMUSCULAR at 20:37
